# Patient Record
Sex: FEMALE | URBAN - METROPOLITAN AREA
[De-identification: names, ages, dates, MRNs, and addresses within clinical notes are randomized per-mention and may not be internally consistent; named-entity substitution may affect disease eponyms.]

---

## 2018-09-11 ENCOUNTER — OFFICE VISIT (OUTPATIENT)
Dept: INTERNAL MEDICINE CLINIC | Age: 28
End: 2018-09-11

## 2018-09-11 VITALS
WEIGHT: 137.2 LBS | RESPIRATION RATE: 16 BRPM | TEMPERATURE: 98.2 F | HEART RATE: 71 BPM | BODY MASS INDEX: 23.42 KG/M2 | HEIGHT: 64 IN | OXYGEN SATURATION: 98 % | DIASTOLIC BLOOD PRESSURE: 58 MMHG | SYSTOLIC BLOOD PRESSURE: 94 MMHG

## 2018-09-11 DIAGNOSIS — Z23 NEED FOR VACCINE FOR TD (TETANUS-DIPHTHERIA): ICD-10-CM

## 2018-09-11 DIAGNOSIS — E55.9 VITAMIN D DEFICIENCY: Primary | ICD-10-CM

## 2018-09-11 RX ORDER — CHOLECALCIFEROL (VITAMIN D3) 125 MCG
CAPSULE ORAL
COMMUNITY

## 2018-09-11 NOTE — MR AVS SNAPSHOT
[address] [place] [address] [facility] [place] [address] [facility] 
[phone] Patient: Minor Petties MRN: [id number] [facility]:[birth year] Visit Information Date & Time Provider Department Dept. Phone Encounter #  
 9/11/2018  3:00 PM Amy De Santiago MD [facility] [phone] [id number] Upcoming Health Maintenance Date Due DTaP/Tdap/Td series (1 - Tdap) 6/2/2009 Influenza Age 5 to Adult 8/1/2018 PAP AKA CERVICAL CYTOLOGY 11/8/2020 Allergies as of 9/11/2018  Review Complete On: 9/11/2018 By: Amy De Santiago MD  
 No Known Allergies Current Immunizations  Never Reviewed No immunizations on file. Not reviewed this visit You Were Diagnosed With   
  
 Codes Comments Need for vaccine for Td (tetanus-diphtheria)    -  Primary ICD-10-CM: B49 ICD-9-CM: V06.5 Vitamin D deficiency     ICD-10-CM: E55.9 ICD-9-CM: 268.9 Vitals BP Pulse Temp Resp Height(growth percentile) Weight(growth percentile) 94/58 71 98.2 °F (36.8 °C) (Oral) 16 5' 4\" (1.626 m) 137 lb 3.2 oz (62.2 kg) LMP SpO2 BMI OB Status Smoking Status 09/04/2018 98% 23.55 kg/m2 Having regular periods Never Smoker BMI and BSA Data Body Mass Index Body Surface Area  
 23.55 kg/m 2 1.68 m 2 Preferred Pharmacy Pharmacy Name Phone [facility] [address] [phone] Your Updated Medication List  
  
   
This list is accurate as of 9/11/18  3:57 PM.  Always use your most recent med list.  
  
  
  
  
 Diphth, Pertus(Acell), Tetanus 2.5-8-5 Lf-mcg-Lf/0.5mL Syrg Commonly known as:  ACEL  
0.5 mL by IntraMUSCular route once for 1 dose. VITAMIN D3 2,000 unit Tab Generic drug:  cholecalciferol (vitamin D3) Take  by mouth. Prescriptions Sent to Pharmacy Refills  Peggy Lopez,, Tetanus (ACEL) 2.5-8-5 Lf-mcg-Lf/0.5mL syrg 0  
 Si.5 mL by IntraMUSCular route once for 1 dose. Class: Normal  
 [facility] [id number] [place] [facility], [address]  #: [phone] Route: IntraMUSCular Introducing [facility]! [facility] introduces [employer] patient portal. Now you can access parts of your medical record, email your doctor's office, and request medication refills online. 1. In your internet browser, go to [email or web address] 2. Click on the First Time User? Click Here link in the Sign In box. You will see the New Member Sign Up page. 3. Enter your [employer] Access Code exactly as it appears below. You will not need to use this code after youve completed the sign-up process. If you do not sign up before the expiration date, you must request a new code. · [employer] Access Code: [email or web address] Expires: 12/10/2018  3:57 PM 
 
4. Enter the last four digits of your Social Security Number (xxxx) and Date of Birth (mm/dd/yyyy) as indicated and click Submit. You will be taken to the next sign-up page. 5. Create a [employer] ID. This will be your [employer] login ID and cannot be changed, so think of one that is secure and easy to remember. 6. Create a [employer] password. You can change your password at any time. 7. Enter your Password Reset Question and Answer. This can be used at a later time if you forget your password. 8. Enter your e-mail address. You will receive e-mail notification when new information is available in [address] 9. Click Sign Up. You can now view and download portions of your medical record. 10. Click the Download Summary menu link to download a portable copy of your medical information. If you have questions, please visit the Frequently Asked Questions section of the [employer] website. Remember, [employer] is NOT to be used for urgent needs. For medical emergencies, dial 911. Now available from your iPhone and Android! Please provide this summary of care documentation to your next provider. Your primary care clinician is listed as Deni Thornton. If you have any questions after today's visit, please call [phone].

## 2018-09-11 NOTE — PROGRESS NOTES
Chief Complaint Patient presents with  New Patient  
  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

## 2018-09-11 NOTE — PROGRESS NOTES
Written by Jules Bull, as dictated by Dr. Roosevelt Reynolds MD. 
 
[address] [facility] Amanda Son is a 25 y.o. female. HPI The patient comes in today to establish care as she recently moved from [facility]. She will be helping out at her son's  and needs to be cleared. She walks daily and takes OTC vitamin D as she was told by her previous PCP that she has a vitamin D deficiency. She does not eat red meat, but notes that she eats beans. She denies fatigue and leg cramping. She gets 8 hours of sleep. She denies allergies. She does not recall receiving a flu shot and she does not get flu shots. Her last pap smear was in 2017. She has 2 children. Her family is in the process of getting their green cards. No current outpatient prescriptions on file prior to visit. No current facility-administered medications on file prior to visit. Past Surgical History:  
Procedure Laterality Date Kervin Jessica GYN    
   Social History Social History  Marital status:  Spouse name: N/A  
 Number of children: N/A  
 Years of education: N/A Occupational History  Not on file. Social History Main Topics  Smoking status: Never Smoker  Smokeless tobacco: Never Used  Alcohol use No  
 Drug use: No  
 Sexual activity: Yes  
  Partners: Male Other Topics Concern  Not on file Social History Narrative  No narrative on file Review of Systems Constitutional: Negative for malaise/fatigue. HENT: Negative for congestion. Eyes: Negative for blurred vision and pain. Respiratory: Negative for cough and shortness of breath. Cardiovascular: Negative for chest pain and palpitations. Gastrointestinal: Negative for abdominal pain and heartburn. Genitourinary: Negative for frequency and urgency. Musculoskeletal: Negative for joint pain and myalgias. Neurological: Negative for dizziness, tingling, sensory change, weakness and headaches. Endo/Heme/Allergies: Negative for environmental allergies. Psychiatric/Behavioral: Negative for depression, memory loss and substance abuse. Visit Vitals  BP 94/58  Pulse 71  Temp 98.2 °F (36.8 °C) (Oral)  Resp 16  
 Ht 5' 4\" (1.626 m)  Wt 137 lb 3.2 oz (62.2 kg)  LMP 09/04/2018  SpO2 98%  BMI 23.55 kg/m2 Physical Exam  
Constitutional: She is oriented to person, place, and time. She appears well-developed and well-nourished. No distress. HENT:  
Right Ear: External ear normal.  
Left Ear: External ear normal.  
Eyes: Conjunctivae and EOM are normal. Right eye exhibits no discharge. Left eye exhibits no discharge. Neck: Normal range of motion. Neck supple. Cardiovascular: Normal rate and regular rhythm. Pulmonary/Chest: Effort normal and breath sounds normal. She has no wheezes. Abdominal: Soft. Bowel sounds are normal. There is no tenderness. Lymphadenopathy:  
  She has no cervical adenopathy. Neurological: She is alert and oriented to person, place, and time. Skin: She is not diaphoretic. Psychiatric: She has a normal mood and affect. Her behavior is normal.  
Nursing note and vitals reviewed. ASSESSMENT and PLAN 
   ICD-10-CM ICD-9-CM 1. Need for vaccine for Td (tetanus-diphtheria) Z23 V06.5 Diphth, Pertus,Acell,, Tetanus (ACEL) 2.5-8-5 Lf-mcg-Lf/0.5mL syrg sent to pharmacy. Tdap ordered. She should ask for a confirmation card from the pharmacy as she is trying to get her green card. 2. Forms completed for work. Cleared based on physical exam.     
 3. Vitamin D deficiency E55.9 268.9 She takes OTC vitamin D. Recommended taking 800 I.u daily dose. This plan was reviewed with the patient and patient agrees. All questions were answered. This scribe documentation was reviewed by me and accurately reflects the examination and decisions made by me. This note will not be viewable in [address]